# Patient Record
Sex: FEMALE | Race: WHITE | NOT HISPANIC OR LATINO | URBAN - METROPOLITAN AREA
[De-identification: names, ages, dates, MRNs, and addresses within clinical notes are randomized per-mention and may not be internally consistent; named-entity substitution may affect disease eponyms.]

---

## 2017-02-14 ENCOUNTER — OFFICE VISIT - RIVER FALLS (OUTPATIENT)
Dept: FAMILY MEDICINE | Facility: CLINIC | Age: 27
End: 2017-02-14

## 2017-02-14 ASSESSMENT — MIFFLIN-ST. JEOR: SCORE: 1260.99

## 2017-04-14 ENCOUNTER — OFFICE VISIT - RIVER FALLS (OUTPATIENT)
Dept: FAMILY MEDICINE | Facility: CLINIC | Age: 27
End: 2017-04-14

## 2017-04-14 LAB
CREAT SERPL-MCNC: 0.61 MG/DL (ref 0.57–1.11)
GLUCOSE BLD-MCNC: 86 MG/DL (ref 65–100)

## 2017-04-17 ENCOUNTER — OFFICE VISIT - RIVER FALLS (OUTPATIENT)
Dept: FAMILY MEDICINE | Facility: CLINIC | Age: 27
End: 2017-04-17

## 2017-04-18 ENCOUNTER — OFFICE VISIT - RIVER FALLS (OUTPATIENT)
Dept: FAMILY MEDICINE | Facility: CLINIC | Age: 27
End: 2017-04-18

## 2017-04-26 ENCOUNTER — OFFICE VISIT - RIVER FALLS (OUTPATIENT)
Dept: FAMILY MEDICINE | Facility: CLINIC | Age: 27
End: 2017-04-26

## 2017-04-26 ENCOUNTER — COMMUNICATION - RIVER FALLS (OUTPATIENT)
Dept: FAMILY MEDICINE | Facility: CLINIC | Age: 27
End: 2017-04-26

## 2017-05-30 ENCOUNTER — AMBULATORY - RIVER FALLS (OUTPATIENT)
Dept: FAMILY MEDICINE | Facility: CLINIC | Age: 27
End: 2017-05-30

## 2017-07-06 ENCOUNTER — COMMUNICATION - RIVER FALLS (OUTPATIENT)
Dept: FAMILY MEDICINE | Facility: CLINIC | Age: 27
End: 2017-07-06

## 2017-07-06 ENCOUNTER — OFFICE VISIT - RIVER FALLS (OUTPATIENT)
Dept: FAMILY MEDICINE | Facility: CLINIC | Age: 27
End: 2017-07-06

## 2017-07-06 ASSESSMENT — MIFFLIN-ST. JEOR: SCORE: 1268.25

## 2017-12-05 ENCOUNTER — AMBULATORY - RIVER FALLS (OUTPATIENT)
Dept: FAMILY MEDICINE | Facility: CLINIC | Age: 27
End: 2017-12-05

## 2017-12-07 ENCOUNTER — AMBULATORY - RIVER FALLS (OUTPATIENT)
Dept: FAMILY MEDICINE | Facility: CLINIC | Age: 27
End: 2017-12-07

## 2019-06-29 ENCOUNTER — OFFICE VISIT - RIVER FALLS (OUTPATIENT)
Dept: FAMILY MEDICINE | Facility: CLINIC | Age: 29
End: 2019-06-29

## 2019-08-30 ENCOUNTER — OFFICE VISIT - RIVER FALLS (OUTPATIENT)
Dept: FAMILY MEDICINE | Facility: CLINIC | Age: 29
End: 2019-08-30

## 2020-03-04 ENCOUNTER — AMBULATORY - RIVER FALLS (OUTPATIENT)
Dept: FAMILY MEDICINE | Facility: CLINIC | Age: 30
End: 2020-03-04

## 2020-03-06 ENCOUNTER — AMBULATORY - RIVER FALLS (OUTPATIENT)
Dept: FAMILY MEDICINE | Facility: CLINIC | Age: 30
End: 2020-03-06

## 2022-02-11 VITALS
OXYGEN SATURATION: 98 % | BODY MASS INDEX: 26.34 KG/M2 | SYSTOLIC BLOOD PRESSURE: 116 MMHG | WEIGHT: 135 LBS | HEART RATE: 94 BPM | DIASTOLIC BLOOD PRESSURE: 68 MMHG | WEIGHT: 136 LBS | HEART RATE: 60 BPM | OXYGEN SATURATION: 99 % | DIASTOLIC BLOOD PRESSURE: 74 MMHG | SYSTOLIC BLOOD PRESSURE: 112 MMHG | BODY MASS INDEX: 26.15 KG/M2 | TEMPERATURE: 98.2 F | TEMPERATURE: 98.3 F

## 2022-02-11 VITALS
TEMPERATURE: 98.3 F | WEIGHT: 156 LBS | SYSTOLIC BLOOD PRESSURE: 122 MMHG | HEART RATE: 87 BPM | DIASTOLIC BLOOD PRESSURE: 70 MMHG | OXYGEN SATURATION: 99 %

## 2022-02-11 VITALS
BODY MASS INDEX: 26.86 KG/M2 | DIASTOLIC BLOOD PRESSURE: 64 MMHG | HEART RATE: 70 BPM | HEIGHT: 60 IN | TEMPERATURE: 98.6 F | SYSTOLIC BLOOD PRESSURE: 104 MMHG | WEIGHT: 136.8 LBS

## 2022-02-11 VITALS
HEIGHT: 60 IN | DIASTOLIC BLOOD PRESSURE: 70 MMHG | TEMPERATURE: 98.5 F | HEART RATE: 64 BPM | WEIGHT: 138.4 LBS | BODY MASS INDEX: 27.17 KG/M2 | SYSTOLIC BLOOD PRESSURE: 114 MMHG

## 2022-02-11 VITALS — TEMPERATURE: 97.7 F

## 2022-02-16 NOTE — NURSING NOTE
PPD Administration POC Entered On:  3/4/2020 9:26 AM CST    Performed On:  3/4/2020 9:25 AM CST by Judi Hoang RN               PPD Administration   PPD Insertion Site :   Right forearm   POC Test Comments :   Wheal created   PPD Amount Administered (mL) :   0.1 mL   Judi Hoang RN - 3/4/2020 9:25 AM CST   Details   Collection Date :   3/4/2020 9:21 AM CST   Expiration Date :   6/12/2022 CDT   Lot#/Manufacture :   [id number]    :   sanofi pasteur Sprague RN, Tara D - 3/4/2020 9:25 AM CST

## 2022-02-16 NOTE — PROGRESS NOTES
After hours phone call    Received:  0823  Returned:  0830    Calls again with concern about migraine.  Advise UC visit.

## 2022-02-16 NOTE — PROCEDURES
Accession Number:       [id number]  CLINICAL INFORMATION::     None given  LMP::     NONE GIVEN  PREV. PAP::     NONE GIVEN  PREV. BX::     NONE GIVEN  SOURCE::     Cervix, Endocervix  STATEMENT OF ADEQUACY::     Satisfactory for evaluation. Endocervical/transformation zone component present. Age and/or menstrual status not provided  INTERPRETATION/RESULT::     Negative for intraepithelial lesion or malignancy.  COMMENT::     This Pap test has been evaluated with computer assisted technology.  CYTOTECHNOLOGIST::     CHIRAG TAO(ASCP) CT Screening location: [place] [id number]

## 2022-02-16 NOTE — NURSING NOTE
PPD Reading POC Entered On:  3/6/2020 9:26 AM CST    Performed On:  3/6/2020 9:22 AM CST by Judi Hoang RN               PPD Reading   PPD mm of Induration :   0 mm   PPD Interpretation :   Negative   PPD Completion Status :   Completed   PPD Result Comment :   Faxed forms to [facility] per [facility]   Judi Hoang RN - 3/6/2020 9:26 AM CST

## 2022-02-16 NOTE — NURSING NOTE
Comprehensive Intake Entered On:  6/29/2019 9:17 AM CDT    Performed On:  6/29/2019 9:13 AM CDT by Quin Long CMA               Summary   Chief Complaint :   patient here today with a migraine present since yesterday    Weight Measured :   156.0 lb(Converted to: 156 lb 0 oz, 70.76 kg)    Systolic Blood Pressure :   122 mmHg   Diastolic Blood Pressure :   70 mmHg   Mean Arterial Pressure :   87 mmHg   Peripheral Pulse Rate :   87 bpm   BP Site :   Right arm   BP Method :   Manual   HR Method :   Electronic   Temperature Tympanic :   98.3 DegF(Converted to: 36.8 DegC)    Oxygen Saturation :   99 %   Race :      Languages :   English   Ethnicity :   Not  or    Quin Long CMA - 6/29/2019 9:13 AM CDT   Health Status   Allergies Verified? :   Yes   Medication History Verified? :   Yes   Pre-Visit Planning Status :   N/A   Tobacco Use? :   Never smoker   Quin Long CMA - 6/29/2019 9:13 AM CDT   Consents   Consent for Immunization Exchange :   Consent Granted   Consent for Immunizations to Providers :   Consent Granted   Quin Long CMA - 6/29/2019 9:13 AM CDT   Problems   (As Of: 6/29/2019 9:17:53 AM CDT)   Problems(Active)    Chlamydia (ICD-9-CM  :079.88 )  Name of Problem:   Chlamydia ; Onset Date:   2/26/2013 ; Recorder:   Kristine Zavala; Confirmation:   Confirmed ; Classification:   Medical ; Code:   079.88 ; Last Updated:   2/28/2014 7:23 PM CST ; Life Cycle Status:   Active ; Responsible Provider:   Kristine Zavala; Vocabulary:   ICD-9-CM        Obesity (ICD-9-CM  :278.00 )  Name of Problem:   Obesity ; Recorder:   SYSTEM; Confirmation:   Probable ; Classification:   Medical ; Code:   278.00 ; Last Updated:   2/28/2014 7:23 PM CST ; Life Cycle Date:   5/17/2012 ; Life Cycle Status:   Active ; Vocabulary:   ICD-9-CM        Tobacco abuse (SNOMED CT  :667321513 )  Name of Problem:   Tobacco abuse ; Recorder:   Krupa Gagnon CMA; Confirmation:   Confirmed ; Classification:    Medical ; Code:   441327603 ; Contributor System:   [employer] ; Last Updated:   2/28/2014 7:23 PM CST ; Life Cycle Date:   3/31/2010 ; Life Cycle Status:   Active ; Vocabulary:   SNOMED CT        Uterus, bicornuate (ICD-9-CM  :752.34 )  Name of Problem:   Uterus, bicornuate ; Recorder:   Lyubov Arceo (Scribe); Confirmation:   Confirmed ; Classification:   Medical ; Code:   752.34 ; Contributor System:   [employer] ; Last Updated:   2/28/2014 7:23 PM CST ; Life Cycle Date:   9/11/2012 ; Life Cycle Status:   Active ; Vocabulary:   ICD-9-CM          Meds / Allergies   (As Of: 6/29/2019 9:17:53 AM CDT)   Allergies (Active)   amoxicillin  Estimated Onset Date:   Unspecified ; Reactions:   Rash ; Created By:   Raeann Gonzalez CMA; Reaction Status:   Active ; Category:   Drug ; Substance:   amoxicillin ; Type:   Allergy ; Updated By:   Raeann Gonzalez CMA; Source:   Paper Chart ; Reviewed Date:   7/6/2017 11:01 AM CDT      penicillins  Estimated Onset Date:   Unspecified ; Reactions:   Rash ; Created By:   Raeann Gonzalez CMA; Reaction Status:   Active ; Category:   Drug ; Substance:   penicillins ; Type:   Allergy ; Updated By:   Raeann Gonzalez CMA; Source:   Paper Chart ; Reviewed Date:   7/6/2017 11:01 AM CDT        Medication List   (As Of: 6/29/2019 9:17:53 AM CDT)   Prescription/Discharge Order    ofloxacin otic  :   ofloxacin otic ; Status:   Completed ; Ordered As Mnemonic:   ofloxacin 0.3% otic solution ; Simple Display Line:   5 drop(s), left ear, BID, for 10 day(s), 5 mL, 0 Refill(s) ; Ordering Provider:   Brigida Tyler; Catalog Code:   ofloxacin otic ; Order Dt/Tm:   7/6/2017 10:58:51 AM            Home Meds    ethinyl estradiol-etonogestrel  :   ethinyl estradiol-etonogestrel ; Status:   Documented ; Ordered As Mnemonic:   NuvaRing 0.120 mg-0.015 mg/24 hours vaginal ring ; Simple Display Line:   1 EA, VAG, q4 wks, 0 Refill(s) ; Catalog Code:   ethinyl estradiol-etonogestrel ; Order Dt/Tm:    6/29/2019 9:17:46 AM

## 2022-02-16 NOTE — PROGRESS NOTES
Patient:   CHANO FUNEZ            MRN: [id number]            FIN: [id number]               Age:   29 years     Sex:  Female     :  1990   Associated Diagnoses:   None   Author:   Blake Mcpherson MD      Visit Information      Primary Care Provider (PCP):  Brigida Tyler    NPI# [id number]      Referring Provider:  Blake Mcpherson MD    NPI# [id number]      Chief Complaint   2019 11:36 AM CDT   JVT Consult- left ear infection per Dr. Almanzar      History of Present Illness   Asked to see by Dr. Almanzar for evaluation of ear infection. Patient reports that she has an infected left ear. She has been seen by me in 2016 for the same problems. At that time I referred her to Dr. Bhatti, a Neurotologist, for definitive treatment. Unfortunately her insurance wouldn't cover the evaluation and treatment out of [place]. She was then seen by an ENT in [place] who had discussed surgery. She declined. Now she wants to reconsider her options including surgery.      Review of Systems   Constitutional:  Negative.    Ear/Nose/Mouth/Throat:  Negative except as documented in history of present illness.    Respiratory:  Negative.       Health Status   Allergies:    Allergic Reactions (Selected)  Severity Not Documented  Amoxicillin (Rash)  Penicillins (Rash)   Medications:  (Selected)   Documented Medications  Documented  NuvaRing 0.120 mg-0.015 mg/24 hours vaginal rin EA, VAG, q4 wks, 0 Refill(s), Type: Maintenance   Problem list:    All Problems (Selected)  Chlamydia / ICD-9-.88 / Confirmed  Obesity / ICD-9-.00 / Probable  Tobacco abuse / SNOMED CT 452090699 / Confirmed  Uterus, bicornuate / ICD-9-.34 / Confirmed      Histories   Past Medical History:    Active  Tobacco abuse (085013800)  Uterus, bicornuate (752.34)   Family History:    Leukemia  Grandmother (M)  CA - Lung cancer  Grandfather (M)  Hypertension  Father  Mother  Skin cancer  Grandfather (P)     Procedure history:    Nexplanon  removal (V25.43) on 5/20/2013 at 22 Years.  Comments:  5/20/2013 9:24 AM CDT - Nayeli Parada  Nexplanon removed from L arm by [facility]  removed due to continuous bleeding  Insertion of Nexplanon (V25.5) on 4/11/2013 at 22 Years.  Comments:  4/11/2013 3:06 PM CDT - Nayeli Parada  Nexplanon placed by [facility] into L arm  due for removal in 3 years (4/11/16)  Mastoid operation (9606920583) in the month of 9/2008 at 18 Years.  Comments:  3/31/2010 11:52 AM CDT - Krupa Rodriguez  left      Physical Examination   Vital Signs   8/30/2019 11:36 AM CDT   Temperature Tympanic      97.7 DegF  LOW     CONSTITUTIONAL  General Appearance:  Normal, well developed, well nourished, no obvious distress  Ability to Communicate:  communicates appropriately.    HEAD AND FACE  Appearance and Symmetry:  Normal, no scalp or facial scarring or suspicious lesions.  Paranasal sinuses tenderness:  Normal, Paranasal sinuses non tender    EARS  Clinical speech reception threshold:  Normal, able to hear normal speech.  Auricle:  Normal, Auricles without scars, lesions, masses.  External auditory canal:  Normal, External auditory canal normal.  Tympanic membrane:  Abscent TM with purulent secretions and granulation.    NOSE (speculum or scope)  Architecture:  Normal, Grossly normal external nasal architecture with no masses or lesions.  Mucosa:  Normal mucosa, No polyps or masses.  Septum:  Normal, Septum non-obstructing.  Turbinates:  Normal, No turbinate abnormalities    ORAL CAVITY AND OROPHARYNX  Lips:  Normal.  Dental and gingiva:  Normal, No obvious dental or gingival disease.  Mucosa:  Normal, Moist mucous membranes.  Tongue:  Normal, Tongue mobile with no mucosal abnormalities  Hard and soft palate:  Normal, Hard and soft palate without cleft or mucosal lesions.  Oral pharynx:  Normal, Posterior pharynx without lesions or remarkable asymmetry.  Saliva:  Normal, Clear saliva.  Masses:  Normal, No palpable masses or pathologically  enlarged lymph nodes.    NECK  Masses/lymph nodes:  Normal, No worrisome neck masses or lymph nodes.  Salivary glands:  Normal, Parotid and submandibular glands.  Trachea and larynx position:  Normal, Trachea and larynx midline.  Thyroid:  Normal, No thyroid abnormality.  Tenderness:  Normal, No cervical tenderness.  Suppleness:  Normal, Neck supple    NEUROLOGICAL  Speech pattern:  Normal, Proasaic    RESPIRATORY  Symmetry and Respiratory effort:  Normal, Symmetric chest movement and expansion with no increased intercostal retractions or use of accessory muscles.       Impression and Plan   Chronic otitis media with chronically draining ear. I discussed treating her acute infection with Ciprodex. I explained that she is going to need a CT scan and an opinion from an ear surgeon. As I explained to her in the past, I no longer do complex ear surgery. I also explained that she may need to consider going to [facility] for an opinion.

## 2022-02-16 NOTE — NURSING NOTE
Comprehensive Intake Entered On:  8/30/2019 11:39 AM CDT    Performed On:  8/30/2019 11:36 AM CDT by Tete Narayan CMA               Summary   Chief Complaint :   JVT Consult- left ear infection per Dr. Almanzar    Temperature Tympanic :   97.7 DegF(Converted to: 36.5 DegC)  (LOW)    Race :      Languages :   English   Ethnicity :   Not  or    Richar Narayan CMAssica - 8/30/2019 11:36 AM CDT   Health Status   Allergies Verified? :   Yes   Medication History Verified? :   Yes   Medical History Verified? :   No   Pre-Visit Planning Status :   Not completed   Tobacco Use? :   Never smoker   Richar Narayan CMAssica - 8/30/2019 11:36 AM CDT   Demographics   Last Name :   ARMIN   Address :   [address] [place] [address]   First Name :   CHANO   Walker Initial :   FIDE   City :   [place]   State :   [place]   Zip Code :   [address]   Tete Narayan CMA - 8/30/2019 11:36 AM CDT   Providers Grid   Provider Name :    Dr. Argueta              Provider Specialty :    ENT--Tete Bryant CMA - 8/30/2019 11:36 AM CDT         Meds / Allergies   (As Of: 8/30/2019 11:39:19 AM CDT)   Allergies (Active)   amoxicillin  Estimated Onset Date:   Unspecified ; Reactions:   Rash ; Created By:   Raeann Gonzalez CMA; Reaction Status:   Active ; Category:   Drug ; Substance:   amoxicillin ; Type:   Allergy ; Updated By:   Raeann Gonzalez CMA; Source:   Paper Chart ; Reviewed Date:   8/30/2019 11:38 AM CDT      penicillins  Estimated Onset Date:   Unspecified ; Reactions:   Rash ; Created By:   Raeann Gonzalez CMA; Reaction Status:   Active ; Category:   Drug ; Substance:   penicillins ; Type:   Allergy ; Updated By:   Raeann Gonzalez CMA; Source:   Paper Chart ; Reviewed Date:   8/30/2019 11:38 AM CDT        Medication List   (As Of: 8/30/2019 11:39:19 AM CDT)   Home Meds    ethinyl estradiol-etonogestrel  :   ethinyl estradiol-etonogestrel ; Status:   Documented ; Ordered As Mnemonic:   NuvaRing 0.120 mg-0.015 mg/24 hours  vaginal ring ; Simple Display Line:   1 EA, VAG, q4 wks, 0 Refill(s) ; Catalog Code:   ethinyl estradiol-etonogestrel ; Order Dt/Tm:   6/29/2019 9:17:46 AM

## 2022-02-16 NOTE — PROGRESS NOTES
Chief Complaint    patient here today with a migraine present since yesterday  History of Present Illness      Patient is here with complaints of headache for last few days.  Associated with nausea and GI symptoms.  Colleagues at work have similar GI symptoms.  She is unsure if the GI symptoms are related to the headache.  She has a long history of intermittent migraine.  She denies other neurologic symptoms.  Review of Systems      See HPI.  All other review of systems negative.  Physical Exam   Vitals & Measurements    T: 98.3   F (Tympanic)  HR: 87(Peripheral)  BP: 122/70  SpO2: 99%     WT: 156.0 lb       Alert, oriented, sitting in a darkened room       Normal extraocular movements, are equal and reactive       No scalp tenderness       Supple without adenopathy       Lungs are clear       Heart has a regular rate and rhythm       Neurologic exam is nonfocal       Cooperative, appropriate affect  Assessment/Plan       Migraine headache (G43.909)         Ketorolac 60 mg IM and promethazine 25 mg IM given with good relief.  She will continue to rest.  Note for work given for the next 2 days.  Follow-up if symptoms do not improve         Ordered:          ketorolac, 60 mg, im, once, (Completed)          promethazine, 25 mg, im, once, (Completed)          49106 therapeutic prophylactic/dx injection subq/im (Charge), Quantity: 1, Migraine headache          60558 therapeutic prophylactic/dx injection subq/im (Charge), Quantity: 1, Migraine headache           ketorolac tromethamine inj, 15 mg (Charge), Quantity: 4, Migraine headache           promethazine hcl injection, 50 mg (Charge), Quantity: 1, Migraine headache           Patient Information     Name:PURVI FUNEZN E      Address:      [address] [place] [address]       [place] [address]     Sex:Female     YOB: 1990     [phone]     MRN:[id number]     FIN:[id number]     Location:[facility]     Date of  Service:2019      Primary Care Physician:       Brigiad Tyler, [phone]      Attending Physician:       Douglas Medrano MD, [phone]  Problem List/Past Medical History    Ongoing     Chlamydia     Menarche     Migraine     Mononucleosis     Obesity     Tobacco abuse     Uterus, bicornuate    Historical     No qualifying data  Procedure/Surgical History     Nexplanon removal (2013)      Comments: Nexplanon removed from L arm by [facility]      removed due to continuous bleeding.     Insertion of Nexplanon (2013)      Comments: Nexplanon placed by [facility] into L arm      due for removal in 3 years (16).     Mastoid operation ()      Comments: left.  Medications        NuvaRing 0.120 mg-0.015 mg/24 hours vaginal rin EA, VAG, q4 wks, 0 Refill(s).         Allergies    amoxicillin (Rash)    penicillins (Rash)  Social History    Smoking Status - 2019     Never smoker     Alcohol      Current, Beer (12 oz), Wine (5 oz), 1-2 times per month, 2017     Employment/School      Employed, 2015     Exercise      Exercise frequency: Never., 2015      Exercise type: Walking., 2011     Home/Environment      Marital status: Single., 2015     Nutrition/Health      Type of diet: Regular., 2015     Sexual      Sexually active: Yes. Sexual orientation: Heterosexual. Contraceptive Use Details: Condoms., 2015     Substance Abuse      Never, 2015     Tobacco      Past, 2015  Family History    CA - Lung cancer: Grandfather (M).    Hypertension: Mother and Father.    Leukemia: Grandmother (M).    Skin cancer: Grandfather (P).    Sleep apnea: Negative: Grandfather (P).  Immunizations      Vaccine Date Status Comments      influenza virus vaccine, inactivated 2013 Recorded      tetanus/diphth/pertuss (Tdap) adult/adol 2013 Recorded      human papillomavirus vaccine 2009 Recorded [3/31/2010] refused dose 2 & 3- caused a rash       meningococcal conjugate vaccine 08/06/2007 Recorded      tetanus/diphth/pertuss (Tdap) adult/adol 08/19/2005 Recorded

## 2022-02-16 NOTE — PROGRESS NOTES
"   Patient:   CHANO FUNEZ            MRN: [id number]            FIN: [id number]               Age:   26 years     Sex:  Female     :  1990   Associated Diagnoses:   Flu-like symptoms; Perforation of left tympanic membrane   Author:   Brigida Tyler      Visit Information      Date of Service: 2017 11:05 am  Performing Location: [facility]  Encounter#: [id number]      Chief Complaint   2017 11:13 AM CDT   Pt c/o bodyaches, fevers on/off x 2 weeks. Works in a . Exposed to influenza.      History of Present Illness   Patient is a 26 year old female who presents with her daughter for evaluation of myalgia, diarrhea, and intermittent fevers x 2 weeks. Patient reports her fiance was seen in ER a few weeks ago where he had negative influenza testing; he was told symptoms thought to be \"due to a bug.\" Shortly after his visit, patient had onset of similar symptoms including fever, sweats, chills, sore throat, decreaed appetite, and diarrhea. She thought she was feeling better over the weekend, but now experiencing dizziness and nausea. She went to work this morning but boss sent her home around 0700 due to her symptoms. She denies similar presentation in the past. No urinary symptoms, blood in stool, vomiting, difficulty breathing, or chest pain. Patient has been taking ibuprofen with mild relief, last dose was this morning at 0800. 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 Reports many of the kids at  have been sick recently including: influenza , RSV, and pneumonia. Lives with her fiance, 4 year old daughter, and 8 year old step-daughter. Her kids have not been sick at home recently. Patient did not get flu vaccine this year. LMP was 1 week ago, denies chance of pregnancy but not on birth control. Former smoker.     Also complains of left ear pain over the past couple of weeks. Reports drainage from left ear on her pillow in the morning \"that looks like " "pus and smells terrible.\" She applied hydrogen peroxide on a q-tip a few days ago; resulted in \"bubbling\" and had some \"yellow pus\" on q-tip. This ear has been chronic longstanding issue for patient. She was supposed to have surgery in 2013 with ENT but has not followed up due to financial concerns and inability to take time off of work as she needs income. States she has insurance through work that will \"kick in\" in June 2017 so she plans to follow with ENT at that time.       Review of Systems   Constitutional:  Fever, Chills, Sweats.    Eye:  Negative.    Ear/Nose/Mouth/Throat:  Sore throat, No nasal congestion.         Ear pain: Left.    Respiratory:  No shortness of breath, No cough, No sputum production, No hemoptysis, No wheezing.    Cardiovascular:  No chest pain, No palpitations.    Gastrointestinal:  Nausea, Diarrhea, No vomiting, No constipation, No abdominal pain.    Genitourinary:  Negative.    Musculoskeletal:  Muscle pain.    Integumentary:  Negative.    Neurologic:  Alert and oriented X4, Headache, No abnormal balance, No confusion, No numbness, No tingling.       Health Status   Allergies:    Allergic Reactions (Selected)  Severity Not Documented  Amoxicillin (Rash)  Penicillins (Rash)   Problem list:    All Problems (Selected)  Chlamydia / ICD-9-.88 / Confirmed  Obesity / ICD-9-.00 / Probable  Tobacco abuse / SNOMED CT 359073259 / Confirmed  Uterus, bicornuate / ICD-9-.34 / Confirmed      Histories   Past Medical History:    Active  Tobacco abuse (107931022)  Uterus, bicornuate (752.34)   Family History:    Leukemia  Grandmother (M)  CA - Lung cancer  Grandfather (M)  Hypertension  Father  Mother  Skin cancer  Grandfather (P)     Procedure history:    Nexplanon removal (V25.43) on 5/20/2013 at 22 Years.  Comments:  5/20/2013 9:24 AM - Nayeli Parada  Nexplanon removed from L arm by [facility]  removed due to continuous bleeding  Insertion of Nexplanon (V25.5) on 4/11/2013 at 22 " Years.  Comments:  4/11/2013 3:06 PM - Nayeli Parada  Nexplanon placed by [facility] into L arm  due for removal in 3 years (4/11/16)  Mastoid operation ([id number]) in the month of 9/2008 at 18 Years.  Comments:  3/31/2010 11:52 AM - Krupa Rodriguez   Social History:        Tobacco Assessment            Past      Substance Abuse Assessment            Never      Employment and Education Assessment            Employed      Home and Environment Assessment            Marital status: Single.      Nutrition and Health Assessment            Type of diet: Regular.      Exercise and Physical Activity Assessment            Exercise frequency: Never.            Exercise type: Walking.      Sexual Assessment            Sexually active: Yes.  Sexual orientation: Heterosexual.  Contraceptive Use Details: Condoms.        Physical Examination   Vital Signs   4/14/2017 11:13 AM CDT Temperature Tympanic 98.2 DegF    Peripheral Pulse Rate 94 bpm    Pulse Site Radial artery    HR Method Manual    Systolic Blood Pressure 112 mmHg    Diastolic Blood Pressure 68 mmHg    Mean Arterial Pressure 83 mmHg    BP Site Right arm    BP Method Manual    Oxygen Saturation 98 %      Measurements from flowsheet : Measurements   4/14/2017 11:13 AM CDT   Weight Measured - Standard                135 lb     General:  Alert and oriented, Appears somewhat uncomfortable, wearing mask.    Eye:  Pupils are equal, round and reactive to light, Extraocular movements are intact, Normal conjunctiva.    HENT:  Oral mucosa is moist, No pharyngeal erythema, Right TM pearly grey with no erythema or bulging. Left TM perforated with clear drainage present, no erythema or bulging.    Neck:  Supple, Non-tender, No lymphadenopathy.    Respiratory:  Lungs are clear to auscultation, Respirations are non-labored, Breath sounds are equal, Symmetrical chest wall expansion.    Cardiovascular:  Normal rate, Regular rhythm, No murmur, No gallop.    Gastrointestinal:   Soft, Non-tender, Non-distended, Normal bowel sounds, No organomegaly.    Musculoskeletal:  Normal range of motion, Normal gait.    Integumentary:  Warm, Dry, No rash.    Neurologic:  Alert, Oriented.       Review / Management   Results review:  Lab results   4/14/2017 11:58 AM CDT Sodium Level 141 mmol/L    Potassium Level 4.4 mmol/L    Chloride Level 105 mmol/L    CO2 Level 26 mmol/L    AGAP 10    Glucose Level 86 mg/dL    BUN 9 mg/dL    Creatinine Level 0.61 mg/dL    BUN/Creat Ratio 15    eGFR  >60    eGFR Non-African American >60    Calcium Level 9.0 mg/dL    WBC 8.5    RBC 4.34    Hgb 12.6 g/dL    Hct 37.9 %    MCV 87 fL    MCH 29.0 pg    MCHC 33.2 g/dL    RDW 13.0 %    Platelet 368    MPV 8.7 fL    Lymphocytes 23.9 %    Abs Lymphocytes 2.0    Neutrophils 67.5 %    Abs Neutrophils 5.7    Monocytes 7.1 %    Abs Monocytes 0.6    Eosinophils 1.3 %    Abs Eosinophils 0.1    Basophils 0.2 %    Abs Basophils 0.0    UA Color Yellow    UA Clarity Clear    UA pH 6.0    UA Specific Gravity 1.010    UA Glucose Negative mg/dL    UA Bilirubin Negative    UA Ketones Negative mg/dL    Urine Occult Blood Negative    UA Protein Negative mg/dL    UA Nitrite Negative    UA Leukocyte Esterase Moderate    UA Urobilinogen Normal    UA Epithelial Cells Few    UA Mucous Present    UA WBC 3-5    UA RBC 0-2    UA Bacteria Few   4/14/2017 11:41 AM CDT Influenza Ag Negative for Influenza A antigen; Negative for Influenza B antigen   .       Impression and Plan   Diagnosis     Flu-like symptoms (UVU25-NI R68.89).     Perforation of left tympanic membrane (ULE25-EB H72.92).     Presents with flu-like symptoms for the past 2 weeks. Afebrile and all vitals normal. Exam shows left TM perforated with clear drainage noted; no other remarkable findings. Influenza swab sent to lab. Also ordered BMP, CBC, and UA. Patient declined strep testing and did not want pertussis testing done (this was offered given her employment at a   and exposure to RSV).   Influenza testing negative. UA shows moderate leukocyte esterase but not overwhelmingly concerning for UTI; UC pending. Will only treat for infection if urine culture is positive. CBC and BMP completley normal. Etiology of patient's symtpoms likely viral in nature. Recommended following BRAT diet, resting, and pushing fluids. Work note provided for patient to stay home until afebrile x 24 hours.   Provided prescription of ofloxacin drops to use as prescribed for left TM perforation. Instructed her to follow up with ENT given that she was supposed to have surgery on left ear 4 years ago with recurrent AOM and other complications. Patient is going to have insurance through work as of 06/2017 so she plans on following with ENT at that time.   Instructed patient to return to clinic if symptoms not improving or go to ED if symtoms worsen. Patient verbalized understanding and agreement with plan. All questions were answered.     agree with exam and plan

## 2022-02-16 NOTE — TELEPHONE ENCOUNTER
After hours phone call    Received:  0808  Returned:  0813    Received call about acute GI symptoms.  Call returned, no answer, massage left.

## 2022-02-16 NOTE — TELEPHONE ENCOUNTER
---------------------  From: Judi Hoang RN   Sent: 6/3/2019 3:37:11 PM CDT  Subject: Schedule with Dr. Mcpherson     VM received from [facility] with [place] stating pt needs to be called to make appt with Dr. Mcpherson  Called pt at [phone] and spoke to pt; transferred call to scheduling to set up visit with ENT.

## 2022-02-16 NOTE — PROGRESS NOTES
Patient:   CHANO FUNEZ            MRN: [id number]            FIN: [id number]               Age:   26 years     Sex:  Female     :  1990   Associated Diagnoses:   Postoperative abdominal pain   Author:   Kristine Zavala      Visit Information      Date of Service: 2017 09:45 am  Performing Location: [facility]  Encounter#: [id number]      Primary Care Provider (PCP):  Kristine Zavala    NPI# [id number]      Referring Provider:  No referring provider recorded for selected visit.      Chief Complaint   2017 9:49 AM CST    c/o abdominal pain after appendectomy on 17 and went back to work yesterday      History of Present Illness   Confirmed symptoms and concerns with patient as presented in CC above. 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 Denies trauma to abdomen or lifting heavy children but did vomit and have pain yesterday and here for eval today.       Review of Systems   Constitutional:  No fever, No chills.    Ear/Nose/Mouth/Throat:  No ear pain, No nasal congestion, No sore throat.    Respiratory:  No shortness of breath, No cough, No wheezing.    Gastrointestinal:  No nausea, No vomiting, No diarrhea.             Health Status   Allergies:    Allergic Reactions (Selected)  Severity Not Documented  Amoxicillin (Rash)  Penicillins (Rash)   Medications:  (Selected)      Problem list:    All Problems  Chlamydia / ICD-9-.88 / Confirmed  Tobacco abuse / SNOMED CT 838377775 / Confirmed  Obesity / ICD-9-.00 / Probable  Uterus, bicornuate / ICD-9-.34 / Confirmed  Inactive: Mononucleosis / ICD-9-  Inactive: Migraine / ICD-9-.90  Inactive: Menarche / ICD-9-CM V21.8  Resolved: Pregnant / SNOMED CT 480692714      Histories   Past Medical History:    Active  Tobacco abuse (492736961)  Uterus, bicornuate (752.34)   Family History:    Leukemia  Grandmother (M)  CA - Lung cancer  Grandfather (M)  Hypertension  Father  Mother  Skin  cancer  Grandfather (P)     Procedure history:    Nexplanon removal (ICD-9-CM V25.43) on 5/20/2013 at 22 Years.  Comments:  5/20/2013 9:24 AM - Nayeli Parada  Nexplanon removed from L arm by [facility]  removed due to continuous bleeding  Insertion of Nexplanon (ICD-9-CM V25.5) on 4/11/2013 at 22 Years.  Comments:  4/11/2013 3:06 PM - Nayeli Parada  Nexplanon placed by [facility] into L arm  due for removal in 3 years (4/11/16)  Mastoid operation (SNOMED CT 5522374637) in the month of 9/2008 at 18 Years.  Comments:  3/31/2010 11:52 AM - Krupa Rodriguez  left   Social History:        Tobacco Assessment            Past      Substance Abuse Assessment            Never      Employment and Education Assessment            Employed      Home and Environment Assessment            Marital status: Single.      Nutrition and Health Assessment            Type of diet: Regular.      Exercise and Physical Activity Assessment            Exercise frequency: Never.            Exercise type: Walking.      Sexual Assessment            Sexually active: Yes.  Sexual orientation: Heterosexual.  Contraceptive Use Details: Condoms.        Physical Examination   Vital Signs   2/14/2017 9:49 AM CST Temperature Tympanic 98.6 DegF    Peripheral Pulse Rate 70 bpm    Pulse Site Radial artery    HR Method Manual    Systolic Blood Pressure 104 mmHg    Diastolic Blood Pressure 64 mmHg    Mean Arterial Pressure 77 mmHg    BP Site Right arm    BP Method Manual      Measurements from flowsheet : Measurements   2/14/2017 9:49 AM CST Height Measured - Standard 60.25 in    Weight Measured - Standard 136.8 lb    BSA 1.62 m2    Body Mass Index 26.49 kg/m2      General:  Alert and oriented, No acute distress.    Eye:  Normal conjunctiva.    HENT:  Tympanic membranes are clear, Normal hearing, Oral mucosa is moist, No pharyngeal erythema, No sinus tenderness.    Neck:  Supple, Non-tender, No lymphadenopathy.    Respiratory:  Lungs are clear to  auscultation, Respirations are non-labored, Breath sounds are equal, Symmetrical chest wall expansion.    Cardiovascular:  Normal rate, Regular rhythm, No murmur.    Gastrointestinal:  Soft, Non-tender, Non-distended, Normal bowel sounds, No organomegaly.    Musculoskeletal:  Normal range of motion, Normal gait.    Integumentary:  Warm, Dry, Pink, No rash.    Neurologic:  Alert, Oriented.    Psychiatric:  Cooperative.       Review / Management   Results review:  post op note reviewed.       Impression and Plan   Diagnosis     Postoperative abdominal pain (RTE13-FU R10.9).     Course:  Improving.    Patient Instructions:       Counseled: Patient, Regarding diagnosis, Regarding treatment, Regarding medications, Verbalized understanding, Counseled on symptomatic management. Return to clinic for re evaluation if worsening, simply not improving, or failure to resolve.   note written for some work restrictions, she is comfortable with this plan.

## 2022-02-16 NOTE — PROGRESS NOTES
Patient:   CHANO FUNEZ            MRN: [id number]            FIN: [id number]               Age:   27 years     Sex:  Female     :  1990   Associated Diagnoses:   Screening for STD (sexually transmitted disease); Well woman exam   Author:   Brigida Tyler      Visit Information      Date of Service: 2017 10:47 am  Performing Location: [facility]  Encounter#: [id number]      Primary Care Provider (PCP):  Brigida Tyler    NPI# [id number]      Chief Complaint   2017 10:55 AM CDT    yearly px      Well Adult History   PPC for annual wellness exam  NILM pap 2015  STD screening negative in  but past hx of chlamydia 2014  would like to conceive, no birth control, light menses 2 weeks ago, is on prenatals  on going unreconstructible ear canal for which she occasionally uses ofloxacin or cipro gtts      Review of Systems   Constitutional:  Negative.    Eye:  Negative.    Ear/Nose/Mouth/Throat:  Negative except as documented in history of present illness.    Respiratory:  Negative.    Cardiovascular:  Negative.    Breast:  Negative.    Gastrointestinal:  Negative.    Genitourinary:  Negative.    Gynecologic:  Negative except as documented in history of present illness.    Hematology/Lymphatics:  Negative.    Endocrine:  Negative.    Immunologic:  Negative.    Musculoskeletal:  Negative.    Integumentary:  Negative.    Neurologic:  Negative.    Psychiatric:  Negative.             Health Status   Allergies:    Allergic Reactions (Selected)  Severity Not Documented  Amoxicillin (Rash)  Penicillins (Rash)   Medications:  (Selected)   Prescriptions  Prescribed  ofloxacin 0.3% otic solution: 5 drop(s), left ear, BID, # 5 mL, 0 Refill(s), Type: Maintenance, called to pharmacy (Rx)   Problem list:    All Problems (Selected)  Chlamydia / ICD-9-.88 / Confirmed  Obesity / ICD-9-.00 / Probable  Tobacco abuse / SNOMED CT 841108127 / Confirmed  Uterus, bicornuate / ICD-9-CM  752.34 / Confirmed      Histories   Family History:    Leukemia  Grandmother (M)  CA - Lung cancer  Grandfather (M)  Hypertension  Father  Mother  Skin cancer  Grandfather (P)     Procedure history:    Nexplanon removal (V25.43) on 5/20/2013 at 22 Years.  Comments:  5/20/2013 9:24 AM - Nayeli Parada  Nexplanon removed from L arm by [facility]  removed due to continuous bleeding  Insertion of Nexplanon (V25.5) on 4/11/2013 at 22 Years.  Comments:  4/11/2013 3:06 PM - Nayeli Parada  Nexplanon placed by [facility] into L arm  due for removal in 3 years (4/11/16)  Mastoid operation ([id number]) in the month of 9/2008 at 18 Years.  Comments:  3/31/2010 11:52 AM - Krupa Rodriguez  left   Social History:        Tobacco Assessment            Past      Substance Abuse Assessment            Never      Employment and Education Assessment            Employed      Home and Environment Assessment            Marital status: Single.      Nutrition and Health Assessment            Type of diet: Regular.      Exercise and Physical Activity Assessment            Exercise frequency: Never.            Exercise type: Walking.      Sexual Assessment            Sexually active: Yes.  Sexual orientation: Heterosexual.  Contraceptive Use Details: Condoms.        Physical Examination   Last Menstrual Period: 6/20/2017   Vital Signs   7/6/2017 10:55 AM CDT Temperature Tympanic 98.5 DegF    Peripheral Pulse Rate 64 bpm    Pulse Site Radial artery    HR Method Manual    Systolic Blood Pressure 114 mmHg    Diastolic Blood Pressure 70 mmHg    Mean Arterial Pressure 85 mmHg    BP Site Right arm    BP Method Manual      Measurements from flowsheet : Measurements   7/6/2017 10:55 AM CDT Height Measured - Standard 60.25 in    Weight Measured - Standard 138.4 lb    BSA 1.63 m2    Body Mass Index 26.8 kg/m2      General:  Alert and oriented, No acute distress.    Eye:  Pupils are equal, round and reactive to light, Intact accommodation, Normal  conjunctiva, Vision unchanged.         Periorbital area: Within normal limits.    HENT:  Normocephalic, Normal hearing, Oral mucosa is moist, No pharyngeal erythema, No sinus tenderness, Left TM not seen.    Neck:  Supple, Non-tender, No lymphadenopathy, No thyromegaly.    Respiratory:  Lungs are clear to auscultation, Respirations are non-labored, No chest wall tenderness.    Cardiovascular:  Normal rate, Regular rhythm, No murmur, No edema.    Breast:  No mass, No tenderness.    Gastrointestinal:  Soft, Non-tender, Non-distended, Normal bowel sounds, No organomegaly.    Genitourinary:  No costovertebral angle tenderness.         Labia: Bilateral, Within normal limits.         Mons pubis: WNL.         Perineum: Within normal limits.         Vulva: Within normal limits.         Urethra: Within normal limits.         Cervix: Within normal limits.         Uterus: Within normal limits.         Adnexa: Bilateral, Within normal limits.    Lymphatics:  No lymphadenopathy neck, axilla, groin.    Musculoskeletal:  Normal range of motion, Normal strength, No swelling.    Integumentary:  Warm, Dry, Pink.    Neurologic:  Alert, Oriented.    Psychiatric:  Cooperative, Appropriate mood & affect.       Review / Management   Results review:  Lab results: 7/6/2017 11:57 AM CDT    U Pregnancy Test          Negative.       Impression and Plan   Diagnosis     Screening for STD (sexually transmitted disease) (WDQ62-RX Z11.3).     Well woman exam (UHE58-BY Z01.419).     Patient Instructions:       Counseled: Patient, Verbalized understanding.    Summary:  STD screening and pap today d/t pregnancy attempt  bicoronate uterus but only one cervix seen  information on area Ob/Gyns given to pt  encouraged ongoing prenatal vitamins   .

## 2022-02-16 NOTE — PROGRESS NOTES
"   Patient:   CHANO FUNEZ            MRN: [id number]            FIN: [id number]               Age:   26 years     Sex:  Female     :  1990   Associated Diagnoses:   Diarrhea; Perforation of left tympanic membrane   Author:   Brigida Tyler      Visit Information      Date of Service: 2017 12:56 pm  Performing Location: [facility]  Encounter#: [id number]      Chief Complaint   2017 1:14 PM CDT    Pt here to f/u bodyaches, fevers from .      History of Present Illness   17:  Patient is a 26 year old female who presents with her daughter for evaluation of myalgia, diarrhea, and intermittent fevers x 2 weeks. Patient reports her fiance was seen in ER a few weeks ago where he had negative influenza testing; he was told symptoms thought to be \"due to a bug.\" Shortly after his visit, patient had onset of similar symptoms including fever, sweats, chills, sore throat, decreaed appetite, and diarrhea. She thought she was feeling better over the weekend, but now experiencing dizziness and nausea. She went to work this morning but boss sent her home around 0700 due to her symptoms. She denies similar presentation in the past. No urinary symptoms, blood in stool, vomiting, difficulty breathing, or chest pain. Patient has been taking ibuprofen with mild relief, last dose was this morning at 0800. 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 Reports many of the kids at  have been sick recently including: influenza , RSV, and pneumonia. Lives with her fiance, 4 year old daughter, and 8 year old step-daughter. Her kids have not been sick at home recently. Patient did not get flu vaccine this year. LMP was 1 week ago, denies chance of pregnancy but not on birth control. Former smoker.     Also complains of left ear pain over the past couple of weeks. Reports drainage from left ear on her pillow in the morning \"that looks like pus and smells terrible.\" She applied " "hydrogen peroxide on a q-tip a few days ago; resulted in \"bubbling\" and had some \"yellow pus\" on q-tip. This ear has been chronic longstanding issue for patient. She was supposed to have surgery in 2013 with ENT but has not followed up due to financial concerns and inability to take time off of work as she needs income. States she has insurance through work that will \"kick in\" in June 2017 so she plans to follow with ENT at that time.      4/17/17:  At last visit above, patient negative for influenza, UA, and UC. CBC and BMP completley normal. She was given ofloxacin drops for left TM perforation.   Returns to clinic with persisting symptoms. Complains diarrhea has been worsening, she is having at least 10 episodes of completely watery diarrhea. Reports diarrhea was brown in color until after visit 3 days ago (4/14) and now it is darker - \"almost black.\" Has not noticed any red blood or blood on tissue with wiping. States \"I feel like I am peeing out my butt.\"   Has had at least 4 episodes of diarrhea already this morning prior to 1300. She left work early to come to clinic after she checked temperature whcih was 102F.   Tried using imodium over the weekend without any relief. Has been taking ibuprofen as needed, last dose was around 1030 this morning.   Also complians of ongoing headaches consistent with her migraines, associated phonophobia and nausea. No vomiting or vision changes.   Denies vomiting, abdominal pain, or urinary symptoms.   Reports family hx inlcudes uncle with crohn's disease. No FH of colon cancer.       Review of Systems   Constitutional:  Fever, Sweats, No chills.    Eye:  Negative.    Ear/Nose/Mouth/Throat:  No nasal congestion, No sore throat     Ear pain: Left.    Respiratory:  Negative.    Cardiovascular:  Negative.    Gastrointestinal:  Nausea, Diarrhea, No vomiting, No constipation, No abdominal pain.    Genitourinary:  Negative.    Musculoskeletal:  Negative.    Integumentary:  Negative. "    Neurologic:  Alert and oriented X4, Headache, No confusion, No numbness, No tingling.       Health Status   Allergies:    Allergic Reactions (Selected)  Severity Not Documented  Amoxicillin (Rash)  Penicillins (Rash)   Medications:  (Selected)   Prescriptions  Prescribed  ofloxacin 0.3% otic solution: 5 drop(s), left ear, BID, # 5 mL, 0 Refill(s), Type: Maintenance, Pharmacy: [id number], 5 drop(s) left ear bid,x10 day(s)   Problem list:    All Problems  Chlamydia / ICD-9-.88 / Confirmed  Obesity / ICD-9-.00 / Probable  Tobacco abuse / SNOMED CT 297034790 / Confirmed  Uterus, bicornuate / ICD-9-.34 / Confirmed  Inactive: Menarche / ICD-9-CM V21.8  Inactive: Migraine / ICD-9-.90  Inactive: Mononucleosis / ICD-9-  Resolved: Pregnant / SNOMED CT 396091529      Histories   Past Medical History:    Active  Tobacco abuse (285886548)  Uterus, bicornuate (752.34)   Family History:    Leukemia  Grandmother (M)  CA - Lung cancer  Grandfather (M)  Hypertension  Father  Mother  Skin cancer  Grandfather (P)     Procedure history:    Nexplanon removal (V25.43) on 5/20/2013 at 22 Years.  Comments:  5/20/2013 9:24 AM - Nayeli Parada  Nexplanon removed from L arm by [facility]  removed due to continuous bleeding  Insertion of Nexplanon (V25.5) on 4/11/2013 at 22 Years.  Comments:  4/11/2013 3:06 PM - Nayeli Parada  Nexplanon placed by [facility] into L arm  due for removal in 3 years (4/11/16)  Mastoid operation ([id number]) in the month of 9/2008 at 18 Years.  Comments:  3/31/2010 11:52 AM - Krupa Rodriguez   Social History:        Tobacco Assessment            Past      Substance Abuse Assessment            Never      Employment and Education Assessment            Employed      Home and Environment Assessment            Marital status: Single.      Nutrition and Health Assessment            Type of diet: Regular.      Exercise and Physical Activity Assessment             Exercise frequency: Never.            Exercise type: Walking.      Sexual Assessment            Sexually active: Yes.  Sexual orientation: Heterosexual.  Contraceptive Use Details: Condoms.        Physical Examination   Vital Signs   4/17/2017 1:14 PM CDT Temperature Tympanic 98.3 DegF    Peripheral Pulse Rate 60 bpm    Pulse Site Radial artery    HR Method Manual    Systolic Blood Pressure 116 mmHg    Diastolic Blood Pressure 74 mmHg    Mean Arterial Pressure 88 mmHg    BP Site Right arm    BP Method Manual    Oxygen Saturation 99 %      Measurements from flowsheet : Measurements   4/17/2017 1:14 PM CDT    Weight Measured - Standard                136 lb     General:  Alert and oriented, No acute distress, Appears comfortable, laughing intermittently throughout assessment.    Eye:  Pupils are equal, round and reactive to light, Normal conjunctiva.    HENT:  Oral mucosa is moist, No pharyngeal erythema, Right TM pearly grey with no erythema or bulging. Left TM perforated with clear drainage present, no erythema or bulging.    Neck:  Supple, Non-tender, No lymphadenopathy.    Respiratory:  Lungs are clear to auscultation, Respirations are non-labored, Breath sounds are equal, Symmetrical chest wall expansion.    Cardiovascular:  Normal rate, Regular rhythm, No murmur, No gallop.    Gastrointestinal:  Soft, Non-distended, Normal bowel sounds, No organomegaly, Mild diffuse tenderness throughout abdomen, no guarding or rebound, negative Garay's sign.  Rectal: Skin is without erythema or induration, no external hemorrhoids. Normal sphincter tone. Brown stool noted on digital exam, no masses palpated. Stool guiaic negative.    Genitourinary:  No costovertebral angle tenderness.    Musculoskeletal:  Normal range of motion, Normal gait.    Integumentary:  Warm, Dry, No rash.    Neurologic:  Alert, Oriented.    Psychiatric:  Cooperative, Appropriate mood & affect.       Review / Management   Results review:  UPT, stool  culture, c diff pending.       Impression and Plan   Diagnosis     Diarrhea (UOG21-ST R19.7).     Perforation of left tympanic membrane (RLG49-NS H72.92).     Presents with ongoing diarrhea x 2.5 weeks. All vitals normal. Exam shows left TM similar to 3 days ago with perforation and clear drainage, no signs of erythema or purulent discharge. Abdominal exam with some diffuse tenderness throughout, no rebound or guarding. Hemoccult was negative. Ordered UPT, stool culture, and c diff.   Encouraged patient to follow BRAT diet, rest, and push fluids. She can consider taking probiotics and anti-diarrheal agents but explained we need to figure out etiology of diarrhea to futher guide treatment.   Left TM perforation present. Encouraged her to continue ofloxacin drops as prescribed. ENT referral placed for patient to follow up as soon as possible.   Work note provided stating she is able to work as long as she has bathroom available to use as needed. However, patient reports she is often working alone and cannot leave infants at  to use restroom. Note stated she should be out if work if no restroom available until stool culture and c diff results have returned. She will follow up with ENT regarding chronic left ear issues for further recommendations/restrictions as well.  Instructed patient to return to clinic if symptoms not improving or go to ED if symtoms acutely worsen. Patient verbalized understanding and agreement with plan. All questions were answered.   agree with exam and plan, discussed follow up